# Patient Record
Sex: FEMALE | Race: BLACK OR AFRICAN AMERICAN | Employment: FULL TIME | URBAN - NONMETROPOLITAN AREA
[De-identification: names, ages, dates, MRNs, and addresses within clinical notes are randomized per-mention and may not be internally consistent; named-entity substitution may affect disease eponyms.]

---

## 2019-06-13 ENCOUNTER — OFFICE VISIT (OUTPATIENT)
Dept: OTOLARYNGOLOGY | Age: 24
End: 2019-06-13
Payer: COMMERCIAL

## 2019-06-13 ENCOUNTER — OFFICE VISIT (OUTPATIENT)
Dept: PRIMARY CARE CLINIC | Age: 24
End: 2019-06-13
Payer: COMMERCIAL

## 2019-06-13 VITALS
HEIGHT: 66 IN | TEMPERATURE: 97.3 F | WEIGHT: 246.6 LBS | DIASTOLIC BLOOD PRESSURE: 86 MMHG | SYSTOLIC BLOOD PRESSURE: 124 MMHG | RESPIRATION RATE: 18 BRPM | HEART RATE: 88 BPM | BODY MASS INDEX: 39.63 KG/M2

## 2019-06-13 VITALS
WEIGHT: 247 LBS | TEMPERATURE: 97.4 F | DIASTOLIC BLOOD PRESSURE: 68 MMHG | HEART RATE: 62 BPM | RESPIRATION RATE: 14 BRPM | SYSTOLIC BLOOD PRESSURE: 120 MMHG | OXYGEN SATURATION: 99 % | BODY MASS INDEX: 39.7 KG/M2 | HEIGHT: 66 IN

## 2019-06-13 DIAGNOSIS — R09.81 NASAL CONGESTION: Primary | ICD-10-CM

## 2019-06-13 DIAGNOSIS — J33.9 NOSE POLYP: Primary | ICD-10-CM

## 2019-06-13 PROCEDURE — 99213 OFFICE O/P EST LOW 20 MIN: CPT | Performed by: NURSE PRACTITIONER

## 2019-06-13 PROCEDURE — 92511 NASOPHARYNGOSCOPY: CPT | Performed by: OTOLARYNGOLOGY

## 2019-06-13 RX ORDER — SERTRALINE HYDROCHLORIDE 100 MG/1
100 TABLET, FILM COATED ORAL
COMMUNITY
Start: 2019-03-28 | End: 2021-10-12

## 2019-06-13 RX ORDER — NORGESTIMATE AND ETHINYL ESTRADIOL 0.25-0.035
1 KIT ORAL
COMMUNITY
Start: 2018-09-21 | End: 2021-10-12

## 2019-06-13 RX ORDER — FLUTICASONE PROPIONATE 50 MCG
2 SPRAY, SUSPENSION (ML) NASAL DAILY
Qty: 1 BOTTLE | Refills: 5 | Status: SHIPPED | OUTPATIENT
Start: 2019-06-13 | End: 2021-10-12

## 2019-06-13 RX ORDER — FERROUS SULFATE 325(65) MG
TABLET ORAL
Refills: 1 | COMMUNITY
Start: 2019-04-11 | End: 2021-10-12

## 2019-06-13 RX ORDER — SPIRONOLACTONE 100 MG/1
100 TABLET, FILM COATED ORAL
COMMUNITY
Start: 2019-01-29 | End: 2021-10-12

## 2019-06-13 RX ORDER — METHYLPREDNISOLONE 4 MG/1
TABLET ORAL
Qty: 1 KIT | Refills: 0 | Status: SHIPPED | OUTPATIENT
Start: 2019-06-13 | End: 2021-10-12

## 2019-06-13 ASSESSMENT — PATIENT HEALTH QUESTIONNAIRE - PHQ9
1. LITTLE INTEREST OR PLEASURE IN DOING THINGS: 0
2. FEELING DOWN, DEPRESSED OR HOPELESS: 0
SUM OF ALL RESPONSES TO PHQ9 QUESTIONS 1 & 2: 0
SUM OF ALL RESPONSES TO PHQ QUESTIONS 1-9: 0
SUM OF ALL RESPONSES TO PHQ QUESTIONS 1-9: 0

## 2019-06-13 ASSESSMENT — ENCOUNTER SYMPTOMS
SHORTNESS OF BREATH: 0
COUGH: 0
WHEEZING: 0
RHINORRHEA: 1

## 2019-06-13 NOTE — PROGRESS NOTES
Subjective:      Patient ID: Lise Aviles is a 27 y.o. female. Pt presents to the clinic for evaluation of a lump in her nose. Pt state that she was having trouble inhaling for about 1 week. She felt that there was something in her nose, felt it, and she felt a lump in left nostril. She report rhinorrhea and pain in bilateral ears. History reviewed. No pertinent past medical history. History reviewed. No pertinent surgical history. Social History     Socioeconomic History    Marital status: Single     Spouse name: None    Number of children: None    Years of education: None    Highest education level: None   Occupational History    None   Social Needs    Financial resource strain: None    Food insecurity:     Worry: None     Inability: None    Transportation needs:     Medical: None     Non-medical: None   Tobacco Use    Smoking status: Never Smoker    Smokeless tobacco: Never Used   Substance and Sexual Activity    Alcohol use: Not Currently    Drug use: None    Sexual activity: None   Lifestyle    Physical activity:     Days per week: None     Minutes per session: None    Stress: None   Relationships    Social connections:     Talks on phone: None     Gets together: None     Attends Cheondoism service: None     Active member of club or organization: None     Attends meetings of clubs or organizations: None     Relationship status: None    Intimate partner violence:     Fear of current or ex partner: None     Emotionally abused: None     Physically abused: None     Forced sexual activity: None   Other Topics Concern    None   Social History Narrative    None       History reviewed. No pertinent family history.     No Known Allergies    Current Outpatient Medications   Medication Sig Dispense Refill    norgestimate-ethinyl estradiol (ORTHO-CYCLEN) 0.25-35 MG-MCG per tablet Take 1 tablet by mouth      sertraline (ZOLOFT) 100 MG tablet Take 100 mg by mouth      spironolactone (ALDACTONE) 100 MG tablet Take 100 mg by mouth      VITAMIN D3 MAXIMUM STRENGTH 5000 units CAPS capsule   1     No current facility-administered medications for this visit. Review of Systems   Constitutional: Negative for activity change, appetite change and fever. HENT: Positive for rhinorrhea. Feels that she has issues inhaling    Respiratory: Negative for cough, shortness of breath and wheezing. Cardiovascular: Negative for chest pain and palpitations. Objective:   Physical Exam   Constitutional: She is oriented to person, place, and time. She appears well-developed and well-nourished. No distress. HENT:   Head: Normocephalic and atraumatic. Cystic polyp noted on the lateral side of the left nostril   Neck: Neck supple. Cardiovascular: Normal rate, regular rhythm and normal heart sounds. Pulmonary/Chest: Effort normal and breath sounds normal.   Neurological: She is alert and oriented to person, place, and time. Nursing note and vitals reviewed. Assessment:      Diagnosis Orders   1.  Nose polyp               Plan:      Referral to ENT  Pt is going to be seen today   Pt to return as needed        JUAN Cronin - CNP

## 2019-06-13 NOTE — PROGRESS NOTES
Sushmaritrip Rush  6/13/19    Chief Complaint   Patient presents with    New Patient     Nasal polyp; sharp pain bilaterally at ears       HPI:  co L nasal obstruction,worse 24 hrs, no pain, had congestion x 1wk    Past Med Hx:   History reviewed. No pertinent past medical history. History reviewed. No pertinent surgical history. Family History:   History reviewed. No pertinent family history.     Social Hx:     Social History     Socioeconomic History    Marital status: Single     Spouse name: Not on file    Number of children: Not on file    Years of education: Not on file    Highest education level: Not on file   Occupational History    Not on file   Social Needs    Financial resource strain: Not on file    Food insecurity:     Worry: Not on file     Inability: Not on file    Transportation needs:     Medical: Not on file     Non-medical: Not on file   Tobacco Use    Smoking status: Never Smoker    Smokeless tobacco: Never Used   Substance and Sexual Activity    Alcohol use: Not Currently    Drug use: Not on file    Sexual activity: Not on file   Lifestyle    Physical activity:     Days per week: Not on file     Minutes per session: Not on file    Stress: Not on file   Relationships    Social connections:     Talks on phone: Not on file     Gets together: Not on file     Attends Pentecostalism service: Not on file     Active member of club or organization: Not on file     Attends meetings of clubs or organizations: Not on file     Relationship status: Not on file    Intimate partner violence:     Fear of current or ex partner: Not on file     Emotionally abused: Not on file     Physically abused: Not on file     Forced sexual activity: Not on file   Other Topics Concern    Not on file   Social History Narrative    Not on file       Current Medications:     Current Outpatient Medications:     norgestimate-ethinyl estradiol (ORTHO-CYCLEN) 0.25-35 MG-MCG per tablet, Take 1 tablet by mouth, Disp: , Rfl:   sertraline (ZOLOFT) 100 MG tablet, Take 100 mg by mouth, Disp: , Rfl:     spironolactone (ALDACTONE) 100 MG tablet, Take 100 mg by mouth, Disp: , Rfl:     VITAMIN D3 MAXIMUM STRENGTH 5000 units CAPS capsule, , Disp: , Rfl: 1    fluticasone (FLONASE) 50 MCG/ACT nasal spray, 2 sprays by Nasal route daily 2 sprays each side once/day, Disp: 1 Bottle, Rfl: 5    methylPREDNISolone (MEDROL, JOAQUÍN,) 4 MG tablet, Use as directed, Disp: 1 kit, Rfl: 0     ROS:   CV: neg   Endocrine:    Resp:     GI:  neg     Neuro:   PE:     General appearance:  Normal                 Ability to Communicate:  Normal       Head & Face:  Normal   Salivary Glands:  Normal              Facial Strength:  Normal   Ears:    Pinna:  Normal            EAC:  Normal      TMs:  Normal       H[place]:  Godwin Rinne     Finger Rub     Nose:    External: Normal    Septum:  Normal    Turbinates: L inf turb edematous          Nasal Cavity: Normal         Naso Pharyngoscopy:          Procedure: NASOPHARYNGOSCOPY     Pre-op dx: nasal congestion     Post - op dx: Same    Indications: same    Anesthesia: Topical spray of oxymetazoline & 4% lidocaine mixed 1:1    Description of procedure: Each nostril was sprayed with an oxymetazoline & 4% lidocaine mixture. After approximately 5 minutes a  fiberoptic nasopharyngoscope was passed through the  nostril. The septum, naso pharynx & eustachian tube openings were visualized.      Findings:      Septum: Normal    Naso pharynx: Normal    R eusatchion tube orifice: Normal    L eustachion tube orifice: Normal    R OMC: mild inf turb edema    L OMC: L inf turb enlarged, no true polyp    Other:     Nasal Endoscopy:      Oral Cavity & Oral Pharynx:    Tongue:  Normal    Teeth & Gums:             Palate:       Tonsils:      FOM:  Normal     Other:      Neck:    Thyroid:Normal       Lymph nodes: Normal           Trachea:  Normal      Masses:  Normal    Other:        Eyes:    EOMs:      Nystagmus: Neurological:    CN V:      CN VII:       Gait & Station:      Romberg:      Tandem Gait:      Halpikes:       Oriented x 3: Normal     Affect:  Normal    Data reviewed:      ASSESSMENT:   Diagnosis Orders   1. Nasal congestion  MD NASOPHARYNGOSCOPY       PLAN:flonase, medrol, see 4 wks  Return in about 1 month (around 2019).     Orders Placed This Encounter   Medications    fluticasone (FLONASE) 50 MCG/ACT nasal spray     Si sprays by Nasal route daily 2 sprays each side once/day     Dispense:  1 Bottle     Refill:  5    methylPREDNISolone (MEDROL, JOAQUÍN,) 4 MG tablet     Sig: Use as directed     Dispense:  1 kit     Refill:  0         Frankie Jean MD

## 2019-07-17 ENCOUNTER — OFFICE VISIT (OUTPATIENT)
Dept: OTOLARYNGOLOGY | Age: 24
End: 2019-07-17
Payer: COMMERCIAL

## 2019-07-17 VITALS
DIASTOLIC BLOOD PRESSURE: 82 MMHG | RESPIRATION RATE: 18 BRPM | HEART RATE: 84 BPM | BODY MASS INDEX: 39.53 KG/M2 | WEIGHT: 246 LBS | HEIGHT: 66 IN | SYSTOLIC BLOOD PRESSURE: 118 MMHG

## 2019-07-17 DIAGNOSIS — R09.81 NASAL CONGESTION: Primary | ICD-10-CM

## 2019-07-17 PROCEDURE — 99213 OFFICE O/P EST LOW 20 MIN: CPT | Performed by: OTOLARYNGOLOGY

## 2019-07-17 PROCEDURE — 96372 THER/PROPH/DIAG INJ SC/IM: CPT | Performed by: OTOLARYNGOLOGY

## 2019-07-17 RX ORDER — METHYLPREDNISOLONE ACETATE 40 MG/ML
40 INJECTION, SUSPENSION INTRA-ARTICULAR; INTRALESIONAL; INTRAMUSCULAR; SOFT TISSUE ONCE
Status: COMPLETED | OUTPATIENT
Start: 2019-07-17 | End: 2019-07-17

## 2019-07-17 RX ORDER — AZELASTINE 1 MG/ML
1 SPRAY, METERED NASAL 2 TIMES DAILY
Qty: 1 BOTTLE | Refills: 3 | Status: SHIPPED | OUTPATIENT
Start: 2019-07-17 | End: 2021-10-12

## 2019-07-17 RX ADMIN — METHYLPREDNISOLONE ACETATE 40 MG: 40 INJECTION, SUSPENSION INTRA-ARTICULAR; INTRALESIONAL; INTRAMUSCULAR; SOFT TISSUE at 09:51

## 2019-07-17 NOTE — PROGRESS NOTES
Beba Arnold  7/17/19  There were no vitals taken for this visit. Allergies as of 07/17/2019    (No Known Allergies)     No chief complaint on file. HPI:  fu for L inf turb hypert, finished medrol, on flonase,still congested    Past Med Hx:   No past medical history on file. No past surgical history on file. Family History:   No family history on file.     Social Hx:     Social History     Socioeconomic History    Marital status: Single     Spouse name: Not on file    Number of children: Not on file    Years of education: Not on file    Highest education level: Not on file   Occupational History    Not on file   Social Needs    Financial resource strain: Not on file    Food insecurity:     Worry: Not on file     Inability: Not on file    Transportation needs:     Medical: Not on file     Non-medical: Not on file   Tobacco Use    Smoking status: Never Smoker    Smokeless tobacco: Never Used   Substance and Sexual Activity    Alcohol use: Not Currently    Drug use: Not on file    Sexual activity: Not on file   Lifestyle    Physical activity:     Days per week: Not on file     Minutes per session: Not on file    Stress: Not on file   Relationships    Social connections:     Talks on phone: Not on file     Gets together: Not on file     Attends Jainism service: Not on file     Active member of club or organization: Not on file     Attends meetings of clubs or organizations: Not on file     Relationship status: Not on file    Intimate partner violence:     Fear of current or ex partner: Not on file     Emotionally abused: Not on file     Physically abused: Not on file     Forced sexual activity: Not on file   Other Topics Concern    Not on file   Social History Narrative    Not on file       Current Medications:     Current Outpatient Medications:     norgestimate-ethinyl estradiol (ORTHO-CYCLEN) 0.25-35 MG-MCG per tablet, Take 1 tablet by mouth, Disp: , Rfl:     sertraline (ZOLOFT) 100 MG tablet, Take 100 mg by mouth, Disp: , Rfl:     spironolactone (ALDACTONE) 100 MG tablet, Take 100 mg by mouth, Disp: , Rfl:     VITAMIN D3 MAXIMUM STRENGTH 5000 units CAPS capsule, , Disp: , Rfl: 1    fluticasone (FLONASE) 50 MCG/ACT nasal spray, 2 sprays by Nasal route daily 2 sprays each side once/day, Disp: 1 Bottle, Rfl: 5    methylPREDNISolone (MEDROL, JOAQUÍN,) 4 MG tablet, Use as directed, Disp: 1 kit, Rfl: 0     ROS:   CV: neg   Endocrine:    Resp:     GI:       Neuro:   PE:     General appearance:  Normal                 Ability to Communicate:  Normal       Head & Face:  Normal   Salivary Glands:  Normal              Facial Strength:  Normal   Ears:    Pinna:  Normal            EAC:  Normal      TMs:  Normal       [place]:  Godwin Rinne     Finger Rub     Nose:    External: Normal    Septum:  Normal    Turbinates: inf turbs enlarged         Nasal Cavity: Normal         Naso Pharyngoscopy:     Nasal Endoscopy:      Oral Cavity & Oral Pharynx:    Tongue:  Normal    Teeth & Gums:             Palate:       Tonsils:      FOM:  Normal     Other:      Neck:    Thyroid:Normal       Lymph nodes: Normal           Trachea:  Normal      Masses:  Normal    Other:        Eyes:    EOMs:      Nystagmus:      Neurological:    CN V:      CN VII:       Gait & Station:      Romberg:      Tandem Gait:      Halpikes:       Oriented x 3: Normal     Affect:  Normal    Data reviewed:      ASSESSMENT:   Diagnosis Orders   1. Nasal congestion         PLAN:stop flonase, astelin, depo IM,consider allergy eval, call in for referral  No follow-ups on file. No orders of the defined types were placed in this encounter.         Rena Tate MD

## 2021-10-12 ENCOUNTER — OFFICE VISIT (OUTPATIENT)
Dept: PRIMARY CARE CLINIC | Age: 26
End: 2021-10-12

## 2021-10-12 VITALS
OXYGEN SATURATION: 96 % | HEART RATE: 70 BPM | SYSTOLIC BLOOD PRESSURE: 122 MMHG | TEMPERATURE: 98.9 F | WEIGHT: 229.4 LBS | BODY MASS INDEX: 37.03 KG/M2 | DIASTOLIC BLOOD PRESSURE: 74 MMHG

## 2021-10-12 DIAGNOSIS — H60.503 ACUTE OTITIS EXTERNA OF BOTH EARS, UNSPECIFIED TYPE: Primary | ICD-10-CM

## 2021-10-12 PROCEDURE — 99213 OFFICE O/P EST LOW 20 MIN: CPT | Performed by: NURSE PRACTITIONER

## 2021-10-12 PROCEDURE — 99213 OFFICE O/P EST LOW 20 MIN: CPT

## 2021-10-12 RX ORDER — OFLOXACIN 3 MG/ML
5 SOLUTION AURICULAR (OTIC) 2 TIMES DAILY
Qty: 10 ML | Refills: 0 | Status: SHIPPED | OUTPATIENT
Start: 2021-10-12 | End: 2021-10-22

## 2021-10-12 ASSESSMENT — PATIENT HEALTH QUESTIONNAIRE - PHQ9
2. FEELING DOWN, DEPRESSED OR HOPELESS: 0
SUM OF ALL RESPONSES TO PHQ QUESTIONS 1-9: 0
SUM OF ALL RESPONSES TO PHQ9 QUESTIONS 1 & 2: 0
1. LITTLE INTEREST OR PLEASURE IN DOING THINGS: 0
SUM OF ALL RESPONSES TO PHQ QUESTIONS 1-9: 0
SUM OF ALL RESPONSES TO PHQ QUESTIONS 1-9: 0

## 2021-10-12 ASSESSMENT — ENCOUNTER SYMPTOMS: COUGH: 0

## 2021-10-12 NOTE — PROGRESS NOTES
[address] [place] [address]  Dept: [phone]  Dept Fax: [phone]  Loc: [phone]        CHIEF COMPLAINT       Chief Complaint   Patient presents with    Otalgia     ear draingae       Nurses Notes reviewed and I agree except as noted in the HPI. HISTORY OF PRESENT ILLNESS   Crista Ayala is a 27 y.o. female who presents to [facility] today (10/18/2021) for evaluation of:   Otalgia   There is pain in both ears. The current episode started 1 to 4 weeks ago (x 2 weeks). The problem occurs constantly. The problem has been gradually worsening. There has been no fever. Associated symptoms include ear discharge (waxy drainage). Pertinent negatives include no coughing. Treatments tried: OTC ear wash. The treatment provided no relief. Pt also is scheduled to have wisdom teeth removed and thought pain was comign from teeth. Tried OTC ear wax remover last night with no relief. REVIEW OF SYSTEMS     Review of Systems   Constitutional: Negative for fever. HENT: Positive for ear discharge (waxy drainage) and ear pain (bilateral, also feels ears popping). Negative for congestion. Respiratory: Negative for cough. PAST MEDICAL HISTORY   History reviewed. No pertinent past medical history. SURGICAL HISTORY     Patient  has no past surgical history on file.     CURRENT MEDICATIONS       Outpatient Medications Prior to Visit   Medication Sig Dispense Refill    azelastine (ASTELIN) 0.1 % nasal spray 1 spray by Nasal route 2 times daily Use in each nostril as directed 1 Bottle 3    norgestimate-ethinyl estradiol (ORTHO-CYCLEN) 0.25-35 MG-MCG per tablet Take 1 tablet by mouth      sertraline (ZOLOFT) 100 MG tablet Take 100 mg by mouth      spironolactone (ALDACTONE) 100 MG tablet Take 100 mg by mouth      VITAMIN D3 MAXIMUM STRENGTH 5000 units CAPS capsule   1    fluticasone DISPOSITION/PLAN     Will start pt on ofloxacin ear gtts twice daily fr bilateral OE. Discussed ways to keep ears dry and other supportive measures for pt symptoms. Encouraged pt to return to clinic should symptoms worsen or fail to improve. Patient Instructions       Patient Education        Swimmer's Ear: Care Instructions  Your Care Instructions     Swimmer's ear (otitis externa) is inflammation or infection of the ear canal. This is the passage that leads from the outer ear to the eardrum. Any water, sand, or other debris that gets into the ear canal and stays there can cause swimmer's ear. Putting cotton swabs or other items in the ear to clean it can also cause this problem. Swimmer's ear can be very painful. But you can treat the pain and infection with medicines. You should feel better in a few days. Follow-up care is a key part of your treatment and safety. Be sure to make and go to all appointments, and call your doctor if you are having problems. It's also a good idea to know your test results and keep a list of the medicines you take. How can you care for yourself at home? Cleaning and care  · Use antibiotic drops as your doctor directs. · Do not insert ear drops (other than the antibiotic ear drops) or anything else into the ear unless your doctor has told you to. · Avoid getting water in the ear until the problem clears up. Use cotton lightly coated with petroleum jelly as an earplug. Do not use plastic earplugs. · Use a hair dryer set on low to carefully dry the ear after you shower. · To ease ear pain, hold a warm washcloth against your ear. · Take pain medicines exactly as directed. ? If the doctor gave you a prescription medicine for pain, take it as prescribed. ? If you are not taking a prescription pain medicine, ask your doctor if you can take an over-the-counter medicine. Inserting ear drops  · Warm the drops to body temperature by rolling the container in your hands.  Or you can place it in a cup of warm water for a few minutes. · Lie down, with your ear facing up. · Place drops inside the ear. Follow your doctor's instructions (or the directions on the label) for how many drops to use. Gently wiggle the outer ear or pull the ear up and back to help the drops get into the ear. · It's important to keep the liquid in the ear canal for 3 to 5 minutes. When should you call for help? Call your doctor now or seek immediate medical care if:    · You have a new or higher fever.     · You have new or worse pain, swelling, warmth, or redness around or behind your ear.     · You have new or increasing pus or blood draining from your ear. Watch closely for changes in your health, and be sure to contact your doctor if:    · You are not getting better after 2 days (48 hours). Where can you learn more? Go to https://[employer].Mindie. org and sign in to your [employer] account. Enter C706 in the Sighter box to learn more about \"Swimmer's Ear: Care Instructions. \"     If you do not have an account, please click on the \"Sign Up Now\" link. Current as of: December 2, 2020               Content Version: 13.0  © 2006-2021 Healthwise, [employer]. Care instructions adapted under license by [facility] ([facility]). If you have questions about a medical condition or this instruction, always ask your healthcare professional. [place] [address] any warranty or liability for your use of this information. Patient Education        Keeping Ears Dry: Care Instructions  Your Care Instructions  Your doctor wants you to keep water from getting into your ears. You may need to do this because of a ruptured eardrum, an ear infection, or other ear problems. Follow-up care is a key part of your treatment and safety. Be sure to make and go to all appointments, and call your doctor if you are having problems.  It's also a good idea to know your test results and keep a list of the medicines you take. How can you care for yourself at home? · Take baths until your doctor says you can take showers again. Avoid getting water in the ear until after the problem clears up. Ask your doctor if you should use earplugs to keep water out of your ears. · Do not swim until your doctor says you can. · If you get water in your ears, turn your head to each side and pull the earlobe in different directions. This will help the water run out. If your ears are still wet, use a hair dryer set on the lowest heat. Hold the dryer several inches from your ear. · Use your medicines exactly as prescribed. Call your doctor if you think you are having a problem with your medicine. Do not put drops in your ears unless your doctor prescribes them. When should you call for help? Watch closely for changes in your health, and be sure to contact your doctor if you have any problems. Where can you learn more? Go to https://[employer].[facility]. org and sign in to your Virgin Play account. Enter W861 in the DailyStrength box to learn more about \"Keeping Ears Dry: Care Instructions. \"     If you do not have an account, please click on the \"Sign Up Now\" link. Current as of: December 2, 2020               Content Version: 13.0  © 1129-6070 Healthwise, Incorporated. Care instructions adapted under license by [facility]. If you have questions about a medical condition or this instruction, always ask your healthcare professional. [place] [address] any warranty or liability for your use of this information. The use, risks, benefits, and potential side effects of prescribed and/or recommended medications were discussed. All questions were answered and the patient/caregiver voiced understanding. No orders of the defined types were placed in this encounter.     Outpatient Encounter Medications as of 10/12/2021   Medication Sig Dispense Refill    ofloxacin (FLOXIN OTIC) 0.3 % otic solution Place 5 drops into both ears 2 times daily for 10 days 10 mL 0    [DISCONTINUED] azelastine (ASTELIN) 0.1 % nasal spray 1 spray by Nasal route 2 times daily Use in each nostril as directed 1 Bottle 3    [DISCONTINUED] norgestimate-ethinyl estradiol (ORTHO-CYCLEN) 0.25-35 MG-MCG per tablet Take 1 tablet by mouth      [DISCONTINUED] sertraline (ZOLOFT) 100 MG tablet Take 100 mg by mouth      [DISCONTINUED] spironolactone (ALDACTONE) 100 MG tablet Take 100 mg by mouth      [DISCONTINUED] VITAMIN D3 MAXIMUM STRENGTH 5000 units CAPS capsule   1    [DISCONTINUED] fluticasone (FLONASE) 50 MCG/ACT nasal spray 2 sprays by Nasal route daily 2 sprays each side once/day 1 Bottle 5    [DISCONTINUED] methylPREDNISolone (MEDROL, JOAQUÍN,) 4 MG tablet Use as directed 1 kit 0     No facility-administered encounter medications on file as of 10/12/2021. No follow-ups on file.                 Electronically signed by JUAN Dewitt CNP on 10/18/2021 at 8:37 AM

## 2021-10-12 NOTE — PATIENT INSTRUCTIONS
Patient Education        Swimmer's Ear: Care Instructions  Your Care Instructions     Swimmer's ear (otitis externa) is inflammation or infection of the ear canal. This is the passage that leads from the outer ear to the eardrum. Any water, sand, or other debris that gets into the ear canal and stays there can cause swimmer's ear. Putting cotton swabs or other items in the ear to clean it can also cause this problem. Swimmer's ear can be very painful. But you can treat the pain and infection with medicines. You should feel better in a few days. Follow-up care is a key part of your treatment and safety. Be sure to make and go to all appointments, and call your doctor if you are having problems. It's also a good idea to know your test results and keep a list of the medicines you take. How can you care for yourself at home? Cleaning and care  · Use antibiotic drops as your doctor directs. · Do not insert ear drops (other than the antibiotic ear drops) or anything else into the ear unless your doctor has told you to. · Avoid getting water in the ear until the problem clears up. Use cotton lightly coated with petroleum jelly as an earplug. Do not use plastic earplugs. · Use a hair dryer set on low to carefully dry the ear after you shower. · To ease ear pain, hold a warm washcloth against your ear. · Take pain medicines exactly as directed. ? If the doctor gave you a prescription medicine for pain, take it as prescribed. ? If you are not taking a prescription pain medicine, ask your doctor if you can take an over-the-counter medicine. Inserting ear drops  · Warm the drops to body temperature by rolling the container in your hands. Or you can place it in a cup of warm water for a few minutes. · Lie down, with your ear facing up. · Place drops inside the ear. Follow your doctor's instructions (or the directions on the label) for how many drops to use.  Gently wiggle the outer ear or pull the ear up and back ears.  · Do not swim until your doctor says you can. · If you get water in your ears, turn your head to each side and pull the earlobe in different directions. This will help the water run out. If your ears are still wet, use a hair dryer set on the lowest heat. Hold the dryer several inches from your ear. · Use your medicines exactly as prescribed. Call your doctor if you think you are having a problem with your medicine. Do not put drops in your ears unless your doctor prescribes them. When should you call for help? Watch closely for changes in your health, and be sure to contact your doctor if you have any problems. Where can you learn more? Go to https://bluebird bio.Puridify. org and sign in to your Bumble Beez account. Enter U248 in the Kustom Codes box to learn more about \"Keeping Ears Dry: Care Instructions. \"     If you do not have an account, please click on the \"Sign Up Now\" link. Current as of: December 2, 2020               Content Version: 13.0  © 2006-2021 Healthwise, Incorporated. Care instructions adapted under license by [facility] ([facility]). If you have questions about a medical condition or this instruction, always ask your healthcare professional. [place] [address] any warranty or liability for your use of this information.

## 2022-11-18 ENCOUNTER — HOSPITAL ENCOUNTER (OUTPATIENT)
Age: 27
Discharge: HOME | End: 2022-11-18

## 2022-11-18 ENCOUNTER — OFFICE VISIT (OUTPATIENT)
Dept: PRIMARY CARE CLINIC | Age: 27
End: 2022-11-18

## 2022-11-18 VITALS
HEART RATE: 74 BPM | SYSTOLIC BLOOD PRESSURE: 112 MMHG | OXYGEN SATURATION: 98 % | DIASTOLIC BLOOD PRESSURE: 74 MMHG | WEIGHT: 218 LBS | BODY MASS INDEX: 35.19 KG/M2 | TEMPERATURE: 98.3 F

## 2022-11-18 DIAGNOSIS — R30.0 DYSURIA: ICD-10-CM

## 2022-11-18 DIAGNOSIS — N89.8 VAGINAL DISCHARGE: ICD-10-CM

## 2022-11-18 DIAGNOSIS — A59.9 TRICHOMONAS INFECTION: Primary | ICD-10-CM

## 2022-11-18 LAB
BACTERIA: ABNORMAL
BILIRUBIN URINE: NEGATIVE
CANDIDA SPECIES, DNA PROBE: NEGATIVE
EPITHELIAL CELLS UA: ABNORMAL /HPF (ref 0–5)
GARDNERELLA VAGINALIS, DNA PROBE: POSITIVE
GLUCOSE URINE: NEGATIVE
KETONES, URINE: NEGATIVE
LEUKOCYTE ESTERASE, URINE: ABNORMAL
NITRITE, URINE: NEGATIVE
PH UA: 6 (ref 5–6)
PROTEIN UA: NEGATIVE
RBC UA: ABNORMAL /HPF (ref 0–4)
SOURCE: ABNORMAL
SPECIFIC GRAVITY UA: 1.01 (ref 1.01–1.02)
TRICHOMONAS VAGINALIS DNA: POSITIVE
TRICHOMONAS: PRESENT
URINE HGB: NEGATIVE
UROBILINOGEN, URINE: NORMAL
WBC UA: ABNORMAL /HPF (ref 0–4)

## 2022-11-18 PROCEDURE — 87480 CANDIDA DNA DIR PROBE: CPT

## 2022-11-18 PROCEDURE — 99213 OFFICE O/P EST LOW 20 MIN: CPT | Performed by: NURSE PRACTITIONER

## 2022-11-18 PROCEDURE — 87510 GARDNER VAG DNA DIR PROBE: CPT

## 2022-11-18 PROCEDURE — 87591 N.GONORRHOEAE DNA AMP PROB: CPT

## 2022-11-18 PROCEDURE — 87660 TRICHOMONAS VAGIN DIR PROBE: CPT

## 2022-11-18 PROCEDURE — 87491 CHLMYD TRACH DNA AMP PROBE: CPT

## 2022-11-18 PROCEDURE — 81001 URINALYSIS AUTO W/SCOPE: CPT

## 2022-11-18 RX ORDER — METRONIDAZOLE 500 MG/1
500 TABLET ORAL 2 TIMES DAILY
Qty: 14 TABLET | Refills: 0 | Status: SHIPPED | OUTPATIENT
Start: 2022-11-18 | End: 2022-11-25

## 2022-11-18 ASSESSMENT — ENCOUNTER SYMPTOMS
DIARRHEA: 0
RESPIRATORY NEGATIVE: 1
BACK PAIN: 1

## 2022-11-18 ASSESSMENT — PATIENT HEALTH QUESTIONNAIRE - PHQ9
SUM OF ALL RESPONSES TO PHQ QUESTIONS 1-9: 0
1. LITTLE INTEREST OR PLEASURE IN DOING THINGS: 0
SUM OF ALL RESPONSES TO PHQ QUESTIONS 1-9: 0
SUM OF ALL RESPONSES TO PHQ9 QUESTIONS 1 & 2: 0
2. FEELING DOWN, DEPRESSED OR HOPELESS: 0

## 2022-11-19 NOTE — PROGRESS NOTES
[facility]             [address], [address]                        Telephone [phone]             Fax [phone]     Henok Meyer  1995  UPK:[id number]   Date of visit:  11/18/2022    Subjective:    Henok Meyer is a 24 y. o.  female who presents to [facility] today (11/18/2022) for evaluation of:    Chief Complaint   Patient presents with    Abdominal Pain     Bilat sides, has had for 1 1/2 months   Vaginal discharge        Vaginal Discharge  The patient's primary symptoms include a genital odor and vaginal discharge. This is a new problem. The current episode started more than 1 month ago. The problem occurs constantly. The problem has been unchanged. The problem affects both sides. She is not pregnant. Associated symptoms include back pain (intermittent low back aches), dysuria and frequency. Pertinent negatives include no diarrhea, discolored urine, fever, hematuria, painful intercourse, rash or urgency. Associated symptoms comments: Bilateral lower abdominal intermittent sharp pain; LMP 11/05/22. The vaginal discharge was yellow and thick. There has been no bleeding. She has not been passing clots. She has not been passing tissue. Nothing aggravates the symptoms. She has tried nothing for the symptoms. The treatment provided no relief. She is sexually active. No, her partner does not have an STD. She uses nothing for contraception. Her menstrual history has been regular. Her past medical history is significant for an STD. She has the following problem list:  There is no problem list on file for this patient. Current medications are:  Current Outpatient Medications   Medication Sig Dispense Refill    metroNIDAZOLE (FLAGYL) 500 MG tablet Take 1 tablet by mouth in the morning and 1 tablet in the evening. Do all this for 7 days.  14 tablet 0     No current facility-administered medications for this visit. She has No Known Allergies. .    She  reports that she has never smoked. She has never used smokeless tobacco.      Objective:    Vitals:    11/18/22 1954   BP: 112/74   Site: Left Upper Arm   Position: Sitting   Cuff Size: Large Adult   Pulse: 74   Temp: 98.3 °F (36.8 °C)   TempSrc: Tympanic   SpO2: 98%   Weight: 218 lb (98.9 kg)     Body mass index is 35.19 kg/m². Review of Systems   Constitutional: Negative. Negative for fever. Respiratory: Negative. Cardiovascular: Negative. Gastrointestinal:  Negative for diarrhea. Genitourinary:  Positive for dysuria, frequency and vaginal discharge. Negative for hematuria, urgency, vaginal bleeding and vaginal pain. Musculoskeletal:  Positive for back pain (intermittent low back aches). Skin:  Negative for rash. Physical Exam  Vitals and nursing note reviewed. Exam conducted with a chaperone present Emily Hamm). Constitutional:       Appearance: Normal appearance. She is well-developed. HENT:      Head: Normocephalic. Jaw: There is normal jaw occlusion. Mouth/Throat:      Lips: Pink. Mouth: Mucous membranes are moist.      Pharynx: Oropharynx is clear. Uvula midline. Eyes:      Pupils: Pupils are equal, round, and reactive to light. Cardiovascular:      Rate and Rhythm: Normal rate and regular rhythm. Heart sounds: Normal heart sounds. Pulmonary:      Effort: Pulmonary effort is normal.      Breath sounds: Normal breath sounds and air entry. Abdominal:      General: Abdomen is flat. Bowel sounds are normal.      Palpations: Abdomen is soft. Tenderness: There is no abdominal tenderness. There is no right CVA tenderness or left CVA tenderness. Genitourinary:     Vagina: Vaginal discharge (thick yellow) present. Cervix: Normal and dilated.       Uterus: Normal.       Adnexa: Right adnexa normal and left adnexa normal.      Rectum: Normal.   Musculoskeletal:      Cervical back: Normal range of motion and neck supple. Skin:     General: Skin is warm and dry. Neurological:      General: No focal deficit present. Mental Status: She is alert and oriented to person, place, and time. Psychiatric:         Behavior: Behavior normal.         Thought Content: Thought content normal.       Assessment and Plan:    Hospital Outpatient Visit on 11/18/2022   Component Date Value Ref Range Status    Glucose, Ur 11/18/2022 NEGATIVE  NEGATIVE Final    Bilirubin Urine 11/18/2022 NEGATIVE  NEGATIVE Final    Ketones, Urine 11/18/2022 NEGATIVE  NEGATIVE Final    Specific Gravity, UA 11/18/2022 1.010  1.010 - 1.025 Final    Urine Hgb 11/18/2022 NEGATIVE  NEGATIVE Final    pH, UA 11/18/2022 6.0  5.0 - 6.0 Final    Protein, UA 11/18/2022 NEGATIVE  NEGATIVE Final    Urobilinogen, Urine 11/18/2022 Normal  Normal Final    Nitrite, Urine 11/18/2022 NEGATIVE  NEGATIVE Final    Leukocyte Esterase, Urine 11/18/2022 1+ (A)  NEGATIVE Final    WBC, UA 11/18/2022 5 TO 10  0 - 4 /HPF Final    RBC, UA 11/18/2022 0 TO 4  0 - 4 /HPF Final    Epithelial Cells UA 11/18/2022 10 TO 25  0 - 5 /HPF Final    Bacteria, UA 11/18/2022 1+ (A)  None Final    Trichomonas, UA 11/18/2022 PRESENT (A)  None Final          Diagnosis Orders   1. Trichomonas infection  metroNIDAZOLE (FLAGYL) 500 MG tablet      2. Dysuria  Urinalysis with Reflex to Culture      3. Vaginal discharge  Vaginitis DNA Probe    C.trachomatis N.gonorrhoeae DNA        I discussed UA results with patient during visit. Complete full course of Flagyl. Abstain from sexual intercourse until treatment completed. We will call with vaginal test results. Notify sexual partner(s) of positive trichomonas result and that they should be treated by PCP. The use, risks, benefits, and side effects of prescribed or recommended medications were discussed. All questions were answered and the patient/caregiver voiced understanding.     No orders of the defined types were placed in this encounter.         Electronically signed by JUAN Bean CNP on 11/18/22 at 7:59 PM EST

## 2022-11-21 LAB
C TRACH DNA GENITAL QL NAA+PROBE: NEGATIVE
N. GONORRHOEAE DNA: NEGATIVE
SPECIMEN DESCRIPTION: NORMAL